# Patient Record
Sex: FEMALE | Race: ASIAN | URBAN - METROPOLITAN AREA
[De-identification: names, ages, dates, MRNs, and addresses within clinical notes are randomized per-mention and may not be internally consistent; named-entity substitution may affect disease eponyms.]

---

## 2023-02-17 ENCOUNTER — OFFICE VISIT (OUTPATIENT)
Dept: PRIMARY CARE CLINIC | Age: 43
End: 2023-02-17
Payer: COMMERCIAL

## 2023-02-17 VITALS
HEART RATE: 87 BPM | DIASTOLIC BLOOD PRESSURE: 77 MMHG | HEIGHT: 64 IN | SYSTOLIC BLOOD PRESSURE: 113 MMHG | OXYGEN SATURATION: 99 % | WEIGHT: 146 LBS | TEMPERATURE: 97.8 F | RESPIRATION RATE: 18 BRPM | BODY MASS INDEX: 24.92 KG/M2

## 2023-02-17 DIAGNOSIS — F41.9 ANXIETY: Primary | ICD-10-CM

## 2023-02-17 DIAGNOSIS — Z11.59 NEED FOR HEPATITIS C SCREENING TEST: ICD-10-CM

## 2023-02-17 DIAGNOSIS — Z00.00 WELL WOMAN EXAM (NO GYNECOLOGICAL EXAM): ICD-10-CM

## 2023-02-17 DIAGNOSIS — Z13.6 ENCOUNTER FOR LIPID SCREENING FOR CARDIOVASCULAR DISEASE: ICD-10-CM

## 2023-02-17 DIAGNOSIS — Z12.31 ENCOUNTER FOR SCREENING MAMMOGRAM FOR MALIGNANT NEOPLASM OF BREAST: ICD-10-CM

## 2023-02-17 DIAGNOSIS — Z13.220 ENCOUNTER FOR LIPID SCREENING FOR CARDIOVASCULAR DISEASE: ICD-10-CM

## 2023-02-17 RX ORDER — HYDROXYZINE HYDROCHLORIDE 10 MG/1
10 TABLET, FILM COATED ORAL
Qty: 30 TABLET | Refills: 2 | Status: SHIPPED | OUTPATIENT
Start: 2023-02-17

## 2023-02-17 RX ORDER — FLUOXETINE 10 MG/1
10 CAPSULE ORAL DAILY
Qty: 30 CAPSULE | Refills: 2 | Status: SHIPPED | OUTPATIENT
Start: 2023-02-17

## 2023-02-17 NOTE — PROGRESS NOTES
HPI:  Snehal Hope is a 44 y.o. female presenting for well woman exam.     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 States she has a lot of stress/ anxiety from her job. Interested in medication management. Denies depression. Denies SI/ HI. Not interested in counseling/ therapy. Diet: normal   Exercise: not currently   Tobacco Use: never  Alcohol: none  Sexually active: yes  Desires STD testing: no    Allergies- reviewed:   No Known Allergies    Medications- reviewed:   Current Outpatient Medications   Medication Sig    hydrOXYzine HCL (ATARAX) 10 mg tablet Take 1 Tablet by mouth three (3) times daily as needed for Anxiety. FLUoxetine (PROzac) 10 mg capsule Take 1 Capsule by mouth daily. No current facility-administered medications for this visit. Past Medical History- reviewed:  History reviewed. No pertinent past medical history. Past Surgical History- reviewed:   History reviewed. No pertinent surgical history. Family History - reviewed:  History reviewed. No pertinent family history.       Social History - reviewed:  Social History     Socioeconomic History    Marital status:      Spouse name: Not on file    Number of children: Not on file    Years of education: Not on file    Highest education level: Not on file   Occupational History    Not on file   Tobacco Use    Smoking status: Never    Smokeless tobacco: Never   Vaping Use    Vaping Use: Never used   Substance and Sexual Activity    Alcohol use: Never    Drug use: Never    Sexual activity: Not on file   Other Topics Concern    Not on file   Social History Narrative    Not on file     Social Determinants of Health     Financial Resource Strain: Low Risk     Difficulty of Paying Living Expenses: Not hard at all   Food Insecurity: No Food Insecurity    Worried About Running Out of Food in the Last Year: Never true    Ran Out of Food in the Last Year: Never true   Transportation Needs: Not on file   Physical Activity: Unknown    Days of Exercise per Week: 0 days    Minutes of Exercise per Session: Not on file   Stress: Not on file   Social Connections: Not on file   Intimate Partner Violence: Not At Risk    Fear of Current or Ex-Partner: No    Emotionally Abused: No    Physically Abused: No    Sexually Abused: No   Housing Stability: Not on file     Immunizations - reviewed: There is no immunization history on file for this patient.   Flu: already done  Tdap: UTD, 3 years ago with last child   Pneumovax:  NA  Zostervax: NA    Health Maintenance reviewed -  Pap smear - 2020, normal, Dr. Silvana Yu at [facility] [address] - due, maternal GM had breast cancer at elderly age  Colonoscopy - no fam hx   DEXA scan NA  Hepatitis C testing - due  Lung cancer screening NA    Review of Systems   Denies fever, chills, sore throat, cough, chest pain, shortness of breath, abd pain, dysuria, hematuria, breast masses, nipple discharge    Physical Exam  Visit Vitals  /77 (BP 1 Location: Right upper arm, BP Patient Position: Sitting, BP Cuff Size: Adult)   Pulse 87   Temp 97.8 °F (36.6 °C) (Temporal)   Resp 18   Ht 5' 4\" (1.626 m)   Wt 146 lb (66.2 kg)   LMP 01/19/2023   SpO2 99%   BMI 25.06 kg/m²       General appearance - alert, well appearing, and in no distress  Eyes - sclera anicteric, left eye normal, right eye normal  Ears - bilateral TM's and external ear canals normal  Nose - normal and patent, no erythema, discharge or polyps  Mouth - mucous membranes moist, pharynx normal without lesions  Neck - supple, no significant adenopathy  Chest - clear to auscultation, no wheezes, rales or rhonchi, symmetric air entry  Heart - normal rate, regular rhythm, normal S1, S2, no murmurs, rubs, clicks or gallops  Abdomen - soft, nontender, nondistended, no masses or organomegaly  Neurological - alert, oriented, normal speech, no focal findings or movement disorder noted  Musculoskeletal - full range of motion without pain  Extremities - no pedal edema noted  Skin - normal coloration and turgor, no rashes, no suspicious skin lesions noted  Pelvic - Not examined   Breast - Not examined       Assessment/Plan:    ICD-10-CM ICD-9-CM    1. Anxiety  F41.9 300.00 hydrOXYzine HCL (ATARAX) 10 mg tablet      FLUoxetine (PROzac) 10 mg capsule      2. Encounter for screening mammogram for malignant neoplasm of breast  Z12.31 V76.12 [facility] MAMMO BI SCREENING INCL CAD      3. Well woman exam (no gynecological exam)  Z00.00 V70.0 CBC WITH AUTOMATED DIFF      METABOLIC PANEL, COMPREHENSIVE      CBC WITH AUTOMATED DIFF      METABOLIC PANEL, COMPREHENSIVE      4. Encounter for lipid screening for cardiovascular disease  Z13.220 V77.91 LIPID PANEL    Z13.6 V81.2 LIPID PANEL      5. Need for hepatitis C screening test  Z11.59 V73.89 HEPATITIS C AB      HEPATITIS C AB        Discussed adverse effects of Prozac including headache, nausea, fatigue, low libido. Sometimes these meds can cause worsening mood. If mood worsens stop and RTC. If SI/ HI needs to call 911. Discussed Atarax can make her sleepy and to try this medication at home for the first time to see how she reacts. Discussed rare risk of prolonged QTc. Denies cardiac issues. I have discussed the diagnosis with the patient and the intended plan as seen in the above orders. Patient verbalized understanding of the plan and agrees with the plan. The patient has received an after-visit summary and questions were answered concerning future plans. I have discussed medication side effects and warnings with the patient as well. Informed patient to return to the office if new symptoms arise.     Marita Blount, DO

## 2023-02-17 NOTE — PROGRESS NOTES
Chief Complaint   Patient presents with    Establish Care      Identified pt with two pt identifiers(name and ). Chief Complaint   Patient presents with    Establish Care        No flowsheet data found. There were no vitals filed for this visit. Health Maintenance Due   Topic Date Due    Hepatitis C Screening  Never done    Depression Screen  Never done    COVID-19 Vaccine (1) Never done    DTaP/Tdap/Td series (1 - Tdap) Never done    Cervical cancer screen  Never done    Lipid Screen  Never done    Flu Vaccine (1) Never done        1. Have you been to the ER, urgent care clinic since your last visit? Hospitalized since your last visit? No    2. Have you seen or consulted any other health care providers outside of the [address] [place] [address] since your last visit? Include any pap smears or colon screening. No    3. For patients aged 39-70: Has the patient had a colonoscopy / FIT/ Cologuard? NA - based on age      If the patient is female:    4. For patients aged 41-77: Has the patient had a mammogram within the past 2 years? No      5. For patients aged 21-65: Has the patient had a pap smear?  Yes - no Care Gap present

## 2023-02-22 LAB
COMMENT, HOLDF: NORMAL
SAMPLES BEING HELD,HOLD: NORMAL

## 2023-05-09 DIAGNOSIS — Z12.31 ENCOUNTER FOR SCREENING MAMMOGRAM FOR MALIGNANT NEOPLASM OF BREAST: Primary | ICD-10-CM

## 2023-05-09 RX ORDER — FLUOXETINE 10 MG/1
10 CAPSULE ORAL DAILY
Qty: 90 CAPSULE | Refills: 2 | Status: SHIPPED | OUTPATIENT
Start: 2023-05-09

## 2023-09-12 ENCOUNTER — TELEMEDICINE (OUTPATIENT)
Dept: PRIMARY CARE CLINIC | Facility: CLINIC | Age: 43
End: 2023-09-12
Payer: COMMERCIAL

## 2023-09-12 DIAGNOSIS — R30.0 DYSURIA: Primary | ICD-10-CM

## 2023-09-12 PROCEDURE — 99213 OFFICE O/P EST LOW 20 MIN: CPT | Performed by: FAMILY MEDICINE

## 2023-09-12 RX ORDER — NITROFURANTOIN 25; 75 MG/1; MG/1
100 CAPSULE ORAL 2 TIMES DAILY
Qty: 10 CAPSULE | Refills: 0 | Status: SHIPPED | OUTPATIENT
Start: 2023-09-12 | End: 2023-09-17

## 2023-09-12 ASSESSMENT — ENCOUNTER SYMPTOMS
BACK PAIN: 0
ABDOMINAL PAIN: 0

## 2023-09-12 NOTE — PROGRESS NOTES
Aretha Yap (:  1980) is a Established patient, here for evaluation of the following:  Urinary Tract Infection (Pain while urinating ) and Urinary Pain    Assessment & Plan   Below is the assessment and plan developed based on review of pertinent history, physical exam, labs, studies, and medications. 1. Dysuria - Discussed potential side effects of medication including but not limited to - GI upset, nausea, headaches, increased liver enzymes/ liver injury, pulmonary toxicity, neuropathy, etc. Attached handout. If symptoms fail to improve then may need additional testing/ workup and should return to care. Will send UA and Urine cx to pharmacy she requests to collect. If fever, vomiting, back pain should go to ER.   -     nitrofurantoin, macrocrystal-monohydrate, (MACROBID) 100 MG capsule; Take 1 capsule by mouth 2 times daily for 5 days, Disp-10 capsule, R-0Normal  -     Urinalysis with Microscopic; Future  -     Culture, Urine; Future    No follow-ups on file. Subjective   Started last week on Tuesday. On Wednesday she started having dysuria. She had a UTI for the first time last year and had Bactrim from 7 days and had some left over. She used it BID for 4 days. She thought it might be near expiration. She has not had fever, chills. Still has dysuria. Denies any chance of pregnancy. Has IUD in place. There is no problem list on file for this patient. Current Outpatient Medications on File Prior to Visit   Medication Sig Dispense Refill    FLUoxetine (PROZAC) 10 MG capsule Take 1 capsule by mouth daily 90 capsule 2    hydrOXYzine HCl (ATARAX) 10 MG tablet Take 1 tablet by mouth 3 times daily as needed       No current facility-administered medications on file prior to visit. No Known Allergies    No past medical history on file. No past surgical history on file. No family history on file.     Social History     Socioeconomic History    Marital status:      Spouse name: Not on

## 2023-10-05 RX ORDER — FLUOXETINE 10 MG/1
10 CAPSULE ORAL DAILY
Qty: 90 CAPSULE | Refills: 2 | Status: SHIPPED | OUTPATIENT
Start: 2023-10-05

## 2024-02-12 RX ORDER — FLUOXETINE 10 MG/1
10 CAPSULE ORAL DAILY
Qty: 90 CAPSULE | Refills: 0 | Status: SHIPPED | OUTPATIENT
Start: 2024-02-12

## 2024-06-03 RX ORDER — FLUOXETINE 10 MG/1
10 CAPSULE ORAL DAILY
Qty: 90 CAPSULE | Refills: 0 | OUTPATIENT
Start: 2024-06-03

## 2024-06-03 NOTE — TELEPHONE ENCOUNTER
PCP: Aileen Mendez DO    Last Visit 9/12/2023   No future appointments.    Requested Prescriptions     Pending Prescriptions Disp Refills    FLUoxetine (PROZAC) 10 MG capsule 90 capsule 0     Sig: Take 1 capsule by mouth daily Appt needed.         Other Comments: Last Refill -02/12/2024

## 2024-06-04 DIAGNOSIS — Z12.31 ENCOUNTER FOR SCREENING MAMMOGRAM FOR MALIGNANT NEOPLASM OF BREAST: Primary | ICD-10-CM

## 2024-06-14 ENCOUNTER — OFFICE VISIT (OUTPATIENT)
Dept: PRIMARY CARE CLINIC | Facility: CLINIC | Age: 44
End: 2024-06-14
Payer: COMMERCIAL

## 2024-06-14 VITALS
HEIGHT: 64 IN | SYSTOLIC BLOOD PRESSURE: 101 MMHG | TEMPERATURE: 97.6 F | RESPIRATION RATE: 14 BRPM | DIASTOLIC BLOOD PRESSURE: 69 MMHG | HEART RATE: 79 BPM | WEIGHT: 151 LBS | OXYGEN SATURATION: 97 % | BODY MASS INDEX: 25.78 KG/M2

## 2024-06-14 DIAGNOSIS — Z00.00 WELL WOMAN EXAM (NO GYNECOLOGICAL EXAM): ICD-10-CM

## 2024-06-14 DIAGNOSIS — E78.5 HYPERLIPIDEMIA, UNSPECIFIED HYPERLIPIDEMIA TYPE: ICD-10-CM

## 2024-06-14 DIAGNOSIS — F41.9 ANXIETY: ICD-10-CM

## 2024-06-14 DIAGNOSIS — Z00.00 WELL WOMAN EXAM (NO GYNECOLOGICAL EXAM): Primary | ICD-10-CM

## 2024-06-14 PROCEDURE — 99214 OFFICE O/P EST MOD 30 MIN: CPT | Performed by: FAMILY MEDICINE

## 2024-06-14 PROCEDURE — 99396 PREV VISIT EST AGE 40-64: CPT | Performed by: FAMILY MEDICINE

## 2024-06-14 RX ORDER — FLUOXETINE 10 MG/1
10 CAPSULE ORAL DAILY
Qty: 90 CAPSULE | Refills: 3 | Status: SHIPPED | OUTPATIENT
Start: 2024-06-14

## 2024-06-14 SDOH — ECONOMIC STABILITY: FOOD INSECURITY: WITHIN THE PAST 12 MONTHS, YOU WORRIED THAT YOUR FOOD WOULD RUN OUT BEFORE YOU GOT MONEY TO BUY MORE.: NEVER TRUE

## 2024-06-14 SDOH — ECONOMIC STABILITY: FOOD INSECURITY: WITHIN THE PAST 12 MONTHS, THE FOOD YOU BOUGHT JUST DIDN'T LAST AND YOU DIDN'T HAVE MONEY TO GET MORE.: NEVER TRUE

## 2024-06-14 SDOH — ECONOMIC STABILITY: HOUSING INSECURITY
IN THE LAST 12 MONTHS, WAS THERE A TIME WHEN YOU DID NOT HAVE A STEADY PLACE TO SLEEP OR SLEPT IN A SHELTER (INCLUDING NOW)?: NO

## 2024-06-14 SDOH — ECONOMIC STABILITY: INCOME INSECURITY: HOW HARD IS IT FOR YOU TO PAY FOR THE VERY BASICS LIKE FOOD, HOUSING, MEDICAL CARE, AND HEATING?: NOT HARD AT ALL

## 2024-06-14 ASSESSMENT — PATIENT HEALTH QUESTIONNAIRE - PHQ9
SUM OF ALL RESPONSES TO PHQ9 QUESTIONS 1 & 2: 0
SUM OF ALL RESPONSES TO PHQ QUESTIONS 1-9: 0
1. LITTLE INTEREST OR PLEASURE IN DOING THINGS: NOT AT ALL
SUM OF ALL RESPONSES TO PHQ QUESTIONS 1-9: 0
SUM OF ALL RESPONSES TO PHQ QUESTIONS 1-9: 0
2. FEELING DOWN, DEPRESSED OR HOPELESS: NOT AT ALL
SUM OF ALL RESPONSES TO PHQ QUESTIONS 1-9: 0

## 2024-06-14 NOTE — PROGRESS NOTES
/69 (Site: Left Upper Arm, Position: Sitting, Cuff Size: Medium Adult)   Pulse 79   Temp 97.6 °F (36.4 °C) (Oral)   Resp 14   Ht 1.626 m (5' 4\")   Wt 68.5 kg (151 lb)   SpO2 97%   BMI 25.92 kg/m²      \"Have you been to the ER, urgent care clinic since your last visit?  Hospitalized since your last visit?\"    NO    “Have you seen or consulted any other health care providers outside of [facility] since your last visit?”    NO     “Have you had a pap smear?”    YES - Where: 2019, results were negative Nurse/CMA to request most recent records if not in the chart    No cervical cancer screening on file             Click Here for Release of Records Request

## 2024-06-14 NOTE — PROGRESS NOTES
HPI     Chief Complaint   Patient presents with    Medication Refill     Patient needs refill on the Prozac        History of Present Illness  The patient is a 44-year-old female who is here for a medication refill.    The patient's mood is effectively managed with Prozac, with no severe side effects reported or thoughts of self-harm. She is seeking a 90-day supply of this medication. She has discontinued the use of Atarax.     Of note she had elevated LDL and Tgs on her last labs in 2023.     Her dietary habits include a diet rich in fruits, vegetables, and meat, and she engages in minimal physical activity. She has declined STD testing. Her tetanus vaccination was administered approximately 4 years ago during her last childbirth. Her last Pap smear, conducted in 2020 in [place], yielded normal results. She has yet to receive a call from the [facility] regarding her mammogram but it was ordered earlier this month. She denies experiencing fevers, chills, sore throat, cough, chest pain, shortness of breath, abdominal pain, dysuria, hematuria, breast masses, or nipple discharge in the past week.    The patient has an intrauterine device (IUD) in place since the end of 2019 and is inquiring about the possibility of having it replaced. States it is due to be replaced in 2026/ 2027 she states.   She has never been a tobacco user. No alcohol use. 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    She has no family history of colon cancer.       Reviewed PmHx, RxHx, FmHx, SocHx, AllgHx and updated and dated in the chart.    Physical Exam:  /69 (Site: Left Upper Arm, Position: Sitting, Cuff Size: Medium Adult)   Pulse 79   Temp 97.6 °F (36.4 °C) (Oral)   Resp 14   Ht 1.626 m (5' 4\")   Wt 68.5 kg (151 lb)   SpO2 97%   BMI 25.92 kg/m²     Physical Exam  The patient is well nourished, well developed, and not in acute distress.  Tympanic membranes are normal bilaterally. Pharynx and

## 2024-06-15 LAB
ALBUMIN SERPL-MCNC: 4 G/DL (ref 3.5–5)
ALBUMIN/GLOB SERPL: 1 (ref 1.1–2.2)
ALP SERPL-CCNC: 90 U/L (ref 45–117)
ALT SERPL-CCNC: 41 U/L (ref 12–78)
ANION GAP SERPL CALC-SCNC: 4 MMOL/L (ref 5–15)
AST SERPL-CCNC: 22 U/L (ref 15–37)
BILIRUB SERPL-MCNC: 0.8 MG/DL (ref 0.2–1)
BUN SERPL-MCNC: 16 MG/DL (ref 6–20)
BUN/CREAT SERPL: 22 (ref 12–20)
CALCIUM SERPL-MCNC: 9.5 MG/DL (ref 8.5–10.1)
CHLORIDE SERPL-SCNC: 106 MMOL/L (ref 97–108)
CHOLEST SERPL-MCNC: 194 MG/DL
CO2 SERPL-SCNC: 28 MMOL/L (ref 21–32)
CREAT SERPL-MCNC: 0.73 MG/DL (ref 0.55–1.02)
GLOBULIN SER CALC-MCNC: 4.1 G/DL (ref 2–4)
GLUCOSE SERPL-MCNC: 94 MG/DL (ref 65–100)
HDLC SERPL-MCNC: 36 MG/DL
HDLC SERPL: 5.4 (ref 0–5)
LDLC SERPL CALC-MCNC: 134.6 MG/DL (ref 0–100)
POTASSIUM SERPL-SCNC: 4 MMOL/L (ref 3.5–5.1)
PROT SERPL-MCNC: 8.1 G/DL (ref 6.4–8.2)
SODIUM SERPL-SCNC: 138 MMOL/L (ref 136–145)
TRIGL SERPL-MCNC: 117 MG/DL
VLDLC SERPL CALC-MCNC: 23.4 MG/DL

## 2025-03-12 ENCOUNTER — OFFICE VISIT (OUTPATIENT)
Dept: PRIMARY CARE CLINIC | Facility: CLINIC | Age: 45
End: 2025-03-12
Payer: COMMERCIAL

## 2025-03-12 VITALS
HEART RATE: 91 BPM | WEIGHT: 154 LBS | DIASTOLIC BLOOD PRESSURE: 75 MMHG | RESPIRATION RATE: 18 BRPM | HEIGHT: 64 IN | OXYGEN SATURATION: 99 % | TEMPERATURE: 97.8 F | BODY MASS INDEX: 26.29 KG/M2 | SYSTOLIC BLOOD PRESSURE: 108 MMHG

## 2025-03-12 DIAGNOSIS — R21 FACIAL RASH: Primary | ICD-10-CM

## 2025-03-12 PROCEDURE — 99214 OFFICE O/P EST MOD 30 MIN: CPT | Performed by: FAMILY MEDICINE

## 2025-03-12 RX ORDER — PREDNISONE 10 MG/1
TABLET ORAL
Qty: 1 EACH | Refills: 0 | Status: SHIPPED | OUTPATIENT
Start: 2025-03-12 | End: 2025-03-12

## 2025-03-12 RX ORDER — PREDNISONE 5 MG/1
TABLET ORAL
Qty: 1 EACH | Refills: 0 | Status: SHIPPED | OUTPATIENT
Start: 2025-03-12

## 2025-03-12 RX ORDER — HYDROCORTISONE 25 MG/G
CREAM TOPICAL
Qty: 20 G | Refills: 1 | Status: SHIPPED | OUTPATIENT
Start: 2025-03-12

## 2025-03-12 RX ORDER — MUPIROCIN 20 MG/G
OINTMENT TOPICAL
Qty: 15 G | Refills: 0 | Status: SHIPPED | OUTPATIENT
Start: 2025-03-12 | End: 2025-03-19

## 2025-03-12 RX ORDER — CLOTRIMAZOLE 1 %
CREAM (GRAM) TOPICAL
Qty: 12 G | Refills: 1 | Status: SHIPPED | OUTPATIENT
Start: 2025-03-12 | End: 2025-03-19

## 2025-03-12 SDOH — ECONOMIC STABILITY: FOOD INSECURITY: WITHIN THE PAST 12 MONTHS, YOU WORRIED THAT YOUR FOOD WOULD RUN OUT BEFORE YOU GOT MONEY TO BUY MORE.: NEVER TRUE

## 2025-03-12 SDOH — ECONOMIC STABILITY: FOOD INSECURITY: WITHIN THE PAST 12 MONTHS, THE FOOD YOU BOUGHT JUST DIDN'T LAST AND YOU DIDN'T HAVE MONEY TO GET MORE.: NEVER TRUE

## 2025-03-12 ASSESSMENT — PATIENT HEALTH QUESTIONNAIRE - PHQ9
SUM OF ALL RESPONSES TO PHQ QUESTIONS 1-9: 0
2. FEELING DOWN, DEPRESSED OR HOPELESS: NOT AT ALL
SUM OF ALL RESPONSES TO PHQ QUESTIONS 1-9: 0
1. LITTLE INTEREST OR PLEASURE IN DOING THINGS: NOT AT ALL

## 2025-03-12 NOTE — PROGRESS NOTES
HPI     Chief Complaint   Patient presents with    Rash     Patient has rash and discoloration on left side of face       History of Present Illness  The patient is a 45-year-old female who presents for evaluation of a rash.    She reports the onset of the rash was at the end of January, initially presenting as two distinct lesions, one on her jaw and the other on her forehead. She describes the rash as raised, scaly, and itchy, extending from her forehead to her upper chest. The rash initially presented as discoloration before becoming elevated, scaly, and itchy. She has not traveled recently and reports no new product use or exposure to sick individuals. She has not used any antifungal treatments. She began using a new perfume on her neck in January but discontinued its use due to the rash. She reports no fever, cold symptoms or sick symptoms during this time. She has been applying over-the-counter hydrocortisone gel, which has provided some relief. However, she experienced a flare-up of the rash approximately two weeks ago, despite intermittent use of the hydrocortisone cream. She has been using CeraVe cream for the past two weeks. She has been able to get a dermatological consultation due to long wait times. She has not experienced any pain associated with the rash, even during periods of significant elevation.    MEDICATIONS  Current: Hydrocortisone cream, CeraVe cream     Reviewed PmHx, RxHx, FmHx, SocHx, AllgHx and updated and dated in the chart.    Physical Exam:  /75   Pulse 91   Temp 97.8 °F (36.6 °C) (Oral)   Resp 18   Ht 1.626 m (5' 4\")   Wt 69.9 kg (154 lb)   SpO2 99%   BMI 26.43 kg/m²     Physical Exam  WNWD NAD  RRR no murmur  CTA no wheezes ronchi rales  No focal deficits  She has multiple small raised erythematous pruritic patches about 1 cm in width on her face (a few on forehead, one to the R of her mouth and 1 on L cheek) she also has a few lesions on her L chin and extending to

## 2025-03-12 NOTE — PROGRESS NOTES
Health Decision Maker has been checked with the patient      AI form was signed    Chief Complaint   Patient presents with    Rash     Patient has rash and discoloration on left side of face       \"Have you been to the ER, urgent care clinic since your last visit?  Hospitalized since your last visit?\"    NO    “Have you seen or consulted any other health care providers outside of [facility] since your last visit?”    NO      There were no vitals filed for this visit.   Depression: Not at risk (6/14/2024)    PHQ-2     PHQ-2 Score: 0       “Have you had a pap smear?”    11/2019    No cervical cancer screening on file             Click Here for Release of Records Request    Chart reviewed: immunizations are documented.     There is no immunization history on file for this patient.

## 2025-05-12 ENCOUNTER — OFFICE VISIT (OUTPATIENT)
Age: 45
End: 2025-05-12

## 2025-05-12 VITALS
RESPIRATION RATE: 22 BRPM | SYSTOLIC BLOOD PRESSURE: 107 MMHG | DIASTOLIC BLOOD PRESSURE: 72 MMHG | OXYGEN SATURATION: 97 % | HEART RATE: 117 BPM | TEMPERATURE: 99.5 F | BODY MASS INDEX: 26.95 KG/M2 | WEIGHT: 157 LBS

## 2025-05-12 DIAGNOSIS — R50.9 FEVER, UNSPECIFIED FEVER CAUSE: Primary | ICD-10-CM

## 2025-05-12 DIAGNOSIS — M54.6 ACUTE BILATERAL THORACIC BACK PAIN: ICD-10-CM

## 2025-05-12 NOTE — PROGRESS NOTES
2025   Aretha Yap (: 1980) is a 45 y.o. female, New patient, here for evaluation of the following chief complaint(s):  Fever (Highest temp of 103.4 today, initial onset of symptoms of bodyaches and chills on Friday. She also reports of RT flank pain when she takes a deep breath. She took Advil around 5:30pm today. )     ASSESSMENT/PLAN:  Below is the assessment and plan developed based on review of pertinent history, physical exam, labs, studies, and medications.  1. Fever, unspecified fever cause  -     XR CHEST (2 VIEWS); Future  2. Acute bilateral thoracic back pain  -     XR CHEST (2 VIEWS); Future    Chest x-ray ordered today and results normal. Suspect possible viral cause at this time. Patient reports that she does not want testing for covid or flu today. Recommended urine testing today in order to rule out possible UTI as cause for fever and back pain; pt refused. Patient denies any numbness, tingling, pain in legs, or loss of bowel or bladder function. Patient advised that she may alternate tylenol and ibuprofen for fever/body aches. Patient advised to increase fluid intake. Patient advised to go to the ED immediately for any worsening symptoms (ex. Fever that does not resolve, increase in pain, development of any numbness, tingling, loss of bowel or bladder function) or any other acute symptoms.    1. Increase fluid intake, ensure adequate nutritional intake.  2. Follow up with PCP as needed.  3. Go to ED for any worsening symptoms or for any new, acute symptoms.   Patient left before discharge handout was given. All of the above information was discussed with patient.    Follow up:    Follow up immediately for any new, worsening or changes or if symptoms are not improving over the next 5-7 days.     SUBJECTIVE/OBJECTIVE:  45-year-old patient here today with complaint of ongoing fever, and body aches, with pain worse in the right middle back area that started 3 days ago.  Reports that pain

## 2025-05-12 NOTE — PATIENT INSTRUCTIONS
Chest x-ray results are pending.  We will call you when x-ray results are received.  Alternate Tylenol and ibuprofen for fever and pain.  Increase intake of water.

## 2025-07-02 RX ORDER — FLUOXETINE 10 MG/1
10 CAPSULE ORAL DAILY
Qty: 90 CAPSULE | Refills: 1 | Status: SHIPPED | OUTPATIENT
Start: 2025-07-02

## 2025-08-06 DIAGNOSIS — R21 FACIAL RASH: ICD-10-CM

## 2025-08-08 RX ORDER — HYDROCORTISONE 25 MG/G
CREAM TOPICAL
Qty: 20 G | Refills: 1 | Status: SHIPPED | OUTPATIENT
Start: 2025-08-08